# Patient Record
Sex: FEMALE | Race: WHITE
[De-identification: names, ages, dates, MRNs, and addresses within clinical notes are randomized per-mention and may not be internally consistent; named-entity substitution may affect disease eponyms.]

---

## 2018-10-18 ENCOUNTER — HOSPITAL ENCOUNTER (OUTPATIENT)
Dept: HOSPITAL 62 - RAD | Age: 43
End: 2018-10-18
Attending: INTERNAL MEDICINE
Payer: SELF-PAY

## 2018-10-18 DIAGNOSIS — C50.412: Primary | ICD-10-CM

## 2018-10-18 PROCEDURE — 70470 CT HEAD/BRAIN W/O & W/DYE: CPT

## 2018-10-18 NOTE — RADIOLOGY REPORT (SQ)
EXAM DESCRIPTION:  CT HEAD COMBO



COMPLETED DATE/TIME:  10/18/2018 2:35 pm



REASON FOR STUDY:  LEFT BREAST CANCER C50.412  MALIG NEOPLASM OF UPPER-OUTER QUADRANT OF LEFT FEMAL



COMPARISON:  None.



TECHNIQUE:  Axial images acquired through the brain without and with intravenous contrast.  Images re
viewed with bone, brain and subdural windows.  Additional sagittal and coronal reconstructions were g
enerated. Images stored on PACS.

All CT scanners at this facility use dose modulation, iterative reconstruction, and/or weight based d
osing when appropriate to reduce radiation dose to as low as reasonably achievable (ALARA).

[facility]: Dose Right  [facility]: CareDose    [facility]: Dose Right    [facility]: Teradose 4D    [facility]: Smart Technologies



CONTRAST TYPE AND DOSE:  contrast/concentration: Isovue 350.00 mg/ml; Total Contrast Delivered: 50.0 
ml; Total Saline Delivered: 55.0 ml



RENAL FUNCTION:  None required. The patient is less than 50 years old.



RADIATION DOSE:  CT Rad equipment meets quality standard of care and radiation dose reduction techniq
ues were employed. CTDIvol: 48.7 - 48.7 mGy. DLP: 2008 mGy-cm..



LIMITATIONS:  None.



FINDINGS:  VENTRICLES: Normal size and contour.

CEREBRUM: No masses. No hemorrhage. No midline shift. Normal gray/white matter differentiation. No ev
idence for acute infarction. No enhancing lesions.

CEREBELLUM: No masses. No hemorrhage. No alteration of density. No evidence for acute infarction. No 
enhancing lesions.

EXTRA-AXIAL SPACES: No fluid collections. No enhancing lesions.

ORBITS AND GLOBE: No intra- or extraconal masses. Normal contour of globe without masses.

CALVARIUM: No fracture.

PARANASAL SINUSES: No fluid or mucosal thickening.

SOFT TISSUES: No mass or hematoma.

OTHER: No other significant finding.



IMPRESSION:  NORMAL BRAIN CT WITHOUT AND WITH CONTRAST.

EVIDENCE OF ACUTE STROKE: NO.



TECHNICAL DOCUMENTATION:  JOB ID:  [id number]

Quality ID # [id number]: Final reports with documentation of one or more dose reduction techniques (e.g., Au
tomated exposure control, adjustment of the mA and/or kV according to patient size, use of iterative 
reconstruction technique)

 2011 [employer]- All Rights Reserved



Reading location - IP/workstation name: [email or web address]

## 2018-10-22 LAB
ANION GAP SERPL CALC-SCNC: 12 MMOL/L (ref 5–19)
BUN SERPL-MCNC: 6 MG/DL (ref 7–20)
CALCIUM: 9.1 MG/DL (ref 8.4–10.2)
CHLORIDE SERPL-SCNC: 103 MMOL/L (ref 98–107)
CO2 SERPL-SCNC: 27 MMOL/L (ref 22–30)
ERYTHROCYTE [DISTWIDTH] IN BLOOD BY AUTOMATED COUNT: 13 % (ref 11.5–14)
GLUCOSE SERPL-MCNC: 101 MG/DL (ref 75–110)
HCT VFR BLD CALC: 41.8 % (ref 36–47)
HGB BLD-MCNC: 14.8 G/DL (ref 12–15.5)
MCH RBC QN AUTO: 30.6 PG (ref 27–33.4)
MCHC RBC AUTO-ENTMCNC: 35.3 G/DL (ref 32–36)
MCV RBC AUTO: 87 FL (ref 80–97)
PLATELET # BLD: 143 10^3/UL (ref 150–450)
POTASSIUM SERPL-SCNC: 4 MMOL/L (ref 3.6–5)
RBC # BLD AUTO: 4.81 10^6/UL (ref 3.72–5.28)
SODIUM SERPL-SCNC: 141.9 MMOL/L (ref 137–145)
WBC # BLD AUTO: 5 10^3/UL (ref 4–10.5)

## 2018-10-26 ENCOUNTER — HOSPITAL ENCOUNTER (OUTPATIENT)
Dept: HOSPITAL 62 - OROUT | Age: 43
Discharge: HOME | End: 2018-10-26
Attending: SURGERY
Payer: SELF-PAY

## 2018-10-26 VITALS — DIASTOLIC BLOOD PRESSURE: 72 MMHG | SYSTOLIC BLOOD PRESSURE: 114 MMHG

## 2018-10-26 DIAGNOSIS — Z79.4: ICD-10-CM

## 2018-10-26 DIAGNOSIS — K44.9: ICD-10-CM

## 2018-10-26 DIAGNOSIS — C50.912: Primary | ICD-10-CM

## 2018-10-26 DIAGNOSIS — J45.909: ICD-10-CM

## 2018-10-26 DIAGNOSIS — R63.4: ICD-10-CM

## 2018-10-26 DIAGNOSIS — E03.9: ICD-10-CM

## 2018-10-26 DIAGNOSIS — Z79.51: ICD-10-CM

## 2018-10-26 DIAGNOSIS — K21.9: ICD-10-CM

## 2018-10-26 DIAGNOSIS — Z88.8: ICD-10-CM

## 2018-10-26 DIAGNOSIS — Z80.3: ICD-10-CM

## 2018-10-26 DIAGNOSIS — B19.20: ICD-10-CM

## 2018-10-26 DIAGNOSIS — Z88.5: ICD-10-CM

## 2018-10-26 DIAGNOSIS — Z01.818: ICD-10-CM

## 2018-10-26 PROCEDURE — 94640 AIRWAY INHALATION TREATMENT: CPT

## 2018-10-26 PROCEDURE — A9520 TC99 TILMANOCEPT DIAG 0.5MCI: HCPCS

## 2018-10-26 PROCEDURE — 19301 PARTIAL MASTECTOMY: CPT

## 2018-10-26 PROCEDURE — 85027 COMPLETE CBC AUTOMATED: CPT

## 2018-10-26 PROCEDURE — 80048 BASIC METABOLIC PNL TOTAL CA: CPT

## 2018-10-26 PROCEDURE — 38500 BIOPSY/REMOVAL LYMPH NODES: CPT

## 2018-10-26 PROCEDURE — 88307 TISSUE EXAM BY PATHOLOGIST: CPT

## 2018-10-26 PROCEDURE — 78195 LYMPH SYSTEM IMAGING: CPT

## 2018-10-26 PROCEDURE — 88342 IMHCHEM/IMCYTCHM 1ST ANTB: CPT

## 2018-10-26 PROCEDURE — 76098 X-RAY EXAM SURGICAL SPECIMEN: CPT

## 2018-10-26 PROCEDURE — 88305 TISSUE EXAM BY PATHOLOGIST: CPT

## 2018-10-26 PROCEDURE — 36415 COLL VENOUS BLD VENIPUNCTURE: CPT

## 2018-10-26 RX ADMIN — LIDOCAINE HYDROCHLORIDE,EPINEPHRINE BITARTRATE ONE ML: 10; .01 INJECTION, SOLUTION INFILTRATION; PERINEURAL at 15:19

## 2018-10-26 RX ADMIN — HYDROMORPHONE HYDROCHLORIDE ONE MG: 2 INJECTION INTRAMUSCULAR; INTRAVENOUS; SUBCUTANEOUS at 17:05

## 2018-10-26 RX ADMIN — HYDROMORPHONE HYDROCHLORIDE ONE MG: 2 INJECTION INTRAMUSCULAR; INTRAVENOUS; SUBCUTANEOUS at 17:25

## 2018-10-26 RX ADMIN — FENTANYL CITRATE ONE MCG: 50 INJECTION INTRAMUSCULAR; INTRAVENOUS at 16:52

## 2018-10-26 RX ADMIN — LIDOCAINE HYDROCHLORIDE,EPINEPHRINE BITARTRATE ONE ML: 10; .01 INJECTION, SOLUTION INFILTRATION; PERINEURAL at 14:40

## 2018-10-26 RX ADMIN — HYDROMORPHONE HYDROCHLORIDE ONE MG: 2 INJECTION INTRAMUSCULAR; INTRAVENOUS; SUBCUTANEOUS at 17:10

## 2018-10-26 RX ADMIN — HYDROMORPHONE HYDROCHLORIDE ONE MG: 2 INJECTION INTRAMUSCULAR; INTRAVENOUS; SUBCUTANEOUS at 17:15

## 2018-10-26 RX ADMIN — METHYLENE BLUE ONE MG: 5 INJECTION INTRAVENOUS at 15:19

## 2018-10-26 RX ADMIN — Medication ONE GM: at 15:19

## 2018-10-26 RX ADMIN — Medication ONE GM: at 14:40

## 2018-10-26 RX ADMIN — METHYLENE BLUE ONE MG: 5 INJECTION INTRAVENOUS at 14:41

## 2018-10-26 RX ADMIN — HYDROMORPHONE HYDROCHLORIDE ONE MG: 2 INJECTION INTRAMUSCULAR; INTRAVENOUS; SUBCUTANEOUS at 17:00

## 2018-10-26 RX ADMIN — FENTANYL CITRATE ONE MCG: 50 INJECTION INTRAMUSCULAR; INTRAVENOUS at 16:57

## 2018-10-26 RX ADMIN — HYDROMORPHONE HYDROCHLORIDE ONE MG: 2 INJECTION INTRAMUSCULAR; INTRAVENOUS; SUBCUTANEOUS at 17:20

## 2018-10-26 NOTE — DISCHARGE SUMMARY
Discharge Summary (SDC)





- Discharge


Final Diagnosis: 





Triple negative left breast carcinoma


Date of Surgery: 10/26/18


Discharge Date: 10/26/18


Condition: Good


Treatment or Instructions: 


Wear supportive bra; prescription on chart for pain medication; may shower in 

48 hours; follow-up with Dr. Sandip Daugherty surgical clinic in 1-2 weeks.


Referrals: 


REGGIE MACIAS MD [Primary Care Provider] - 


Discharge Diet: As Tolerated


Discharge Activity: Activity As Tolerated


Home Care Assistance: None Needed


Report the Following to Your Physician Immediately: Shortness of Breath, 

Increase in Pain, Fever over 101 Degrees

## 2018-10-26 NOTE — RADIOLOGY REPORT (SQ)
EXAM DESCRIPTION:  NM LYMPHATICS/LYMPH GLANDS



COMPLETED DATE/TIME:  10/26/2018 12:26 pm



REASON FOR STUDY:  BREAST CANCER C50.912  MALIGNANT NEOPLASM OF UNSPECIFIED SITE OF LEFT FEMAL Z80.3 
 FAMILY HISTORY OF MALIGNANT NEOPLASM OF BREAST



COMPARISON:  None.



RADIONUCLIDE AND DOSE:  612 microcuries TC-99m tilmanocept - [employer].

The route of agent administration:  Subcutaneous in the skin.



TECHNIQUE:  The skin of the left breast was prepped in sterile fashion.  The radiopharmaceutical was 
administered in equally divided doses in the periareolar breast.



LIMITATIONS:  None.



FINDINGS:  Images demonstrate activity at the injection site.  There is also activity near the left a
xilla secondary to a sentinel lymph node.



IMPRESSION:  ADMINISTRATION OF RADIOPHARMACEUTICAL FOR SENTINEL LYMPH NODE EVALUATION.



TECHNICAL DOCUMENTATION:  JOB ID:  [id number]

 2011 [employer]- All Rights Reserved



Reading location - IP/workstation name: [email or web address]

## 2018-10-26 NOTE — OPERATIVE REPORT
Operative Report


DATE OF SURGERY: 10/26/18


PREOPERATIVE DIAGNOSIS: Triple negative left breast carcinoma, poorly 

differentiated


POSTOPERATIVE DIAGNOSIS: Same


OPERATION: 1.  Page lymph node biopsy left axilla 2.  2.  Ultrasound 

directed left breast lumpectomy including anterior margin sent separately.  3.  

Interpretation of intraoperative specimen radiograph


SURGEON: ALLISON GRAHAM


ANESTHESIA: GA


TISSUE REMOVED OR ALTERED: Page lymph nodes left axilla x2; left breast 

lump with tumor and clip marker; anterior lumpectomy margin


COMPLICATIONS: 





None


ESTIMATED BLOOD LOSS: Scant


INTRAOPERATIVE FINDINGS: See below


PROCEDURE: 





The patient was seen in the preop holding area after undergoing 

lymphoscintigraphy of the left axilla.  She was found to have increased 

activity in the left axilla by bedside neoprobe scanning.





She was taken to the main operating room where general anesthesia was induced.  

Left arm was abducted.  As part of the dual mapping technique, we injected 2-1/

2 cc of dilute methylene blue into the left breast at the o'clock position 

areolar border.  Left breast was massaged.  The left breast and left axilla 

were prepped and draped in sterile fashion.





Surgical plan surgical timeout were conducted.





Ultrasonography of the left breast confirmed the well-circumscribed 2 cm tumor 

in the upper outer quadrant of the left breast, approximately 3-4 cm from the 

nipple at the 2 o'clock position.  Clip marker retained within the tumor.





We proceeded with sentinel lymph node biopsy first.  Skin was Marisol times 1% 

plain lidocaine, 3 cm incision was made in the low axilla and sentinel node 

harvesting executed.  2 sentinel lymph nodes were removed both level 1 both hot 

and blue.  The first lymph node had an in vivo count of 15,426 and an ex vivo 

count of 8814.  A sentinel hot blue lymph node with associated non-hot but blue 

lymph node was excised with an in vivo count of 3284 and an ex vivo count of 

2925.  Background counts were negligible.  We felt the axillary sentinel lymph 

node portion of the procedure was complete.





We planned our incision for left breast lumpectomy using ultrasound guidance.  

Skin was Marisol times 1% plain lidocaine, a 4+ centimeter incision was made over 

the palpable mass, and a reasonable sized lumpectomy specimen was taken from 

just under the skin all the way down to the deep breast tissue using 

intraoperative ultrasonography real-time as a guide to the dissection.  The 

lumpectomy specimen was removed from the left breast, oriented with a short 

suture in the superior position and a long suture in the lateral position.  

Imaged with the specimen radiograph machine in the operating room and found to 

contain the tumor, and the clip.





Of note radiology had gone home for the day; pathology had gone home for the 

day.





I therefore felt that given the proximity of the tumor to the anterior skin 

surface, that excising the initial lumpectomy incision, with a small portion of 

cutaneous tissue would be appropriate as an anterior margin.  This in fact was 

affected using a #15 blade.  This was essentially a [place] of skin.  It was 

oriented on a blue pad with the lateral margin marked laterally and the medial 

margin marked medially.  Skin was oriented in a couple direction consistent 

with the vertical lumpectomy made on the patient.





All specimens were sent for permanent analysis.  Wounds checked for hemostasis, 

and there was no need for cauterization.  Avitene was placed in the recesses of 

both wounds and wounds closed with 3-0 Vicryl, benzoin and Steri-Strips.





Patient tolerated procedure well, extubated, taken recovery room stable 

condition.

## 2018-10-29 NOTE — RADIOLOGY REPORT (SQ)
EXAM DESCRIPTION:  BREAST SPECIMEN



COMPLETED DATE/TIME:  10/26/2018 5:06 pm



REASON FOR STUDY:  LEFT BREAST LUMPECTOMY C50.912  MALIGNANT NEOPLASM OF UNSPECIFIED SITE OF LEFT FEM
AL Z80.3  FAMILY HISTORY OF MALIGNANT NEOPLASM OF BREAST



COMPARISON:  None.



TECHNIQUE:  Specimen radiograph from breast procedure performed in the operating room.



LIMITATIONS:  None.



FINDINGS:  Specimen radiograph from breast procedure performed in the operating room.

Please see procedure note for details and final pathology.



IMPRESSION:  Specimen radiograph.



TECHNICAL DOCUMENTATION:  JOB ID:  [id number]



Reading location - IP/workstation name: [facility]

## 2018-11-04 ENCOUNTER — HOSPITAL ENCOUNTER (EMERGENCY)
Dept: HOSPITAL 62 - ER | Age: 43
Discharge: LEFT BEFORE DISCHARGE | End: 2018-11-04
Payer: SELF-PAY

## 2018-11-04 VITALS — SYSTOLIC BLOOD PRESSURE: 128 MMHG | DIASTOLIC BLOOD PRESSURE: 86 MMHG

## 2018-11-04 DIAGNOSIS — Z53.21: Primary | ICD-10-CM

## 2018-11-21 ENCOUNTER — HOSPITAL ENCOUNTER (OUTPATIENT)
Dept: HOSPITAL 62 - OROUT | Age: 43
Discharge: HOME | End: 2018-11-21
Attending: SURGERY
Payer: MEDICAID

## 2018-11-21 VITALS — DIASTOLIC BLOOD PRESSURE: 60 MMHG | SYSTOLIC BLOOD PRESSURE: 100 MMHG

## 2018-11-21 DIAGNOSIS — Z88.8: ICD-10-CM

## 2018-11-21 DIAGNOSIS — Z86.19: ICD-10-CM

## 2018-11-21 DIAGNOSIS — Z80.3: ICD-10-CM

## 2018-11-21 DIAGNOSIS — Z88.5: ICD-10-CM

## 2018-11-21 DIAGNOSIS — C50.912: Primary | ICD-10-CM

## 2018-11-21 DIAGNOSIS — K44.9: ICD-10-CM

## 2018-11-21 DIAGNOSIS — Z79.899: ICD-10-CM

## 2018-11-21 DIAGNOSIS — Z87.891: ICD-10-CM

## 2018-11-21 DIAGNOSIS — J45.909: ICD-10-CM

## 2018-11-21 DIAGNOSIS — E03.9: ICD-10-CM

## 2018-11-21 DIAGNOSIS — R63.4: ICD-10-CM

## 2018-11-21 DIAGNOSIS — K21.9: ICD-10-CM

## 2018-11-21 LAB
ERYTHROCYTE [DISTWIDTH] IN BLOOD BY AUTOMATED COUNT: 12.8 % (ref 11.5–14)
HCT VFR BLD CALC: 40.5 % (ref 36–47)
HGB BLD-MCNC: 14.5 G/DL (ref 12–15.5)
MCH RBC QN AUTO: 30.7 PG (ref 27–33.4)
MCHC RBC AUTO-ENTMCNC: 35.8 G/DL (ref 32–36)
MCV RBC AUTO: 86 FL (ref 80–97)
PLATELET # BLD: 153 10^3/UL (ref 150–450)
RBC # BLD AUTO: 4.73 10^6/UL (ref 3.72–5.28)
WBC # BLD AUTO: 6 10^3/UL (ref 4–10.5)

## 2018-11-21 PROCEDURE — 71045 X-RAY EXAM CHEST 1 VIEW: CPT

## 2018-11-21 PROCEDURE — C1752 CATH,HEMODIALYSIS,SHORT-TERM: HCPCS

## 2018-11-21 PROCEDURE — 36561 INSERT TUNNELED CV CATH: CPT

## 2018-11-21 PROCEDURE — 85027 COMPLETE CBC AUTOMATED: CPT

## 2018-11-21 PROCEDURE — 36415 COLL VENOUS BLD VENIPUNCTURE: CPT

## 2018-11-21 PROCEDURE — 77001 FLUOROGUIDE FOR VEIN DEVICE: CPT

## 2018-11-21 PROCEDURE — C1769 GUIDE WIRE: HCPCS

## 2018-11-21 PROCEDURE — C1788 PORT, INDWELLING, IMP: HCPCS

## 2018-11-21 NOTE — DISCHARGE SUMMARY
Discharge Summary (SDC)





- Discharge


Final Diagnosis: 





Left breast cancer


Date of Surgery: 11/21/18


Discharge Date: 11/21/18


Condition: Stable


Treatment or Instructions: 


PLAN:


Wound Care:


-You may shower in 48 hours. Leave steri strips (paper bandaids) intact until 

they fall off on their own. Do not scrub area. Allow warm water/soap to wash 

over area, pat dry and cover if needed.





Pain Management:


-You may take Toradol 10mg one pill by mouth every six hours as needed for pain.





Follow Up:


- You may follow up at [facility] in 7-10 days for evaluation. Call 

clinic or go to ER if you experience swelling in your neck, difficulty breathing

, foul-smelling drainage, or redness surrounding incision site. Call clinic 

sooner with questions/concerns. 





Prescriptions: 


Ketorolac Tromethamine [Toradol 10 mg Tablet] 10 mg PO Q6HP PRN #20 tablet


 PRN Reason: 


Referrals: 


REGGIE MACIAS MD [Primary Care Provider] - 


Discharge Diet: As Tolerated


Discharge Activity: Activity As Tolerated


Report the Following to Your Physician Immediately: Shortness of Breath, 

Increase in Pain, Unusual Bleeding, Redness, Swelling, Warmth, Drainage-Foul 

Smelling

## 2018-11-21 NOTE — RADIOLOGY REPORT (SQ)
EXAM DESCRIPTION:  CHEST SINGLE VIEW



COMPLETED DATE/TIME:  11/21/2018 10:20 am



REASON FOR STUDY:  Port placement



COMPARISON:  None.



EXAM PARAMETERS:  NUMBER OF VIEWS: One view.

TECHNIQUE: Single frontal radiographic view of the chest acquired.

RADIATION DOSE: NA

LIMITATIONS: None.



FINDINGS:  LUNGS AND PLEURA: No opacities, masses or pneumothorax. No pleural effusion.

MEDIASTINUM AND HILAR STRUCTURES: No masses.  Contour normal.

HEART AND VASCULAR STRUCTURES: Heart normal in size.  Normal vasculature.

BONES: No acute findings.

HARDWARE: Right-sided Port-A-Cath is identified with its tip the level of the superior vena cava P

OTHER: No other significant finding.



IMPRESSION:  Right-sided Port-A-Cath with its tip at the level of the superior vena cava.  No pneumot
horax is seen.  Other findings as noted above



TECHNICAL DOCUMENTATION:  JOB ID:  [id number]

 2011 [employer]- All Rights Reserved



Reading location - IP/workstation name: [email or web address]

## 2018-11-21 NOTE — OPERATIVE REPORT
Operative Report


DATE OF SURGERY: 11/21/18


PREOPERATIVE DIAGNOSIS: Triple negative left breast carcinoma, status post left 

breast lumpectomy and sentinel lymph node biopsy


POSTOPERATIVE DIAGNOSIS: Same


OPERATION: 1.  Focused ultrasound of the neck.  2.  Ultrasound directed 

insertion of single-lumen Cxaxvy-k-Ktme catheter right subclavian position.  3.

  Interpretation of intraoperative fluoroscopy.


SURGEON: ALLISON ALLEN ASSISTANT: SUBHASH CARVAJAL


ANESTHESIA: LMAC


TISSUE REMOVED OR ALTERED: Scant


COMPLICATIONS: 





None


ESTIMATED BLOOD LOSS: 20 cc


INTRAOPERATIVE FINDINGS: See below


PROCEDURE: 





The patient was taken from the preop holding area the main operating room where 

LMAC anesthesia was induced.  Arms were tucked at the side, neck and head 

externally rotated to the left side.  The right neck chest and axilla were 

prepped and draped in sterile fashion.





Surgical plan surgical timeout were conducted.





Using real-time ultrasound as a guide, the right internal jugular vein was 

identified, found to be compressible suitable for cannulation.  Overlying skin 

was anesthetized with 1% plain lidocaine.  A small incision was made with a #15 

blade, and under fluoroscopic guidance, a micro needle and wire was threaded 

into the vessel.  The micro wire did not advance in the usual fashion.  We 

threaded the micro-introducer dilator and sheath over the micro wire and the 

microwire removed and the 0.035 inch guidewire inserted into the sheath.  

During this transition, it was apparent that the blood coming from around the 

wire was arterial, and therefore the procedure was aborted.  The wire and 

sheath were removed uneventfully and gentle pressure held.





During this time the patient's peripheral IV access early failed.  Anesthesia 

had to manipulate the Hep-Lock to reestablish successful IV access which was 

accomplished.  Appropriate level of sedation was again achieved.





There is no evidence of hematoma in the neck.  We felt that we could proceed as 

planned again accessing the right neck.  Using real-time ultrasound again as a 

guide, a second attempt was made to cannulate the right internal jugular vein.  

This was successful with the micro needle and wire.  A suitable site for 

placement of port was chosen in the right subclavian position.  Skin was 

anesthetized with 1% plain lidocaine.  A 3 cm incision was made with a knife, 

port pocket developed large enough to accommodate a single-chamber port.  

Catheter was then trimmed the appropriate length, tunneled between the 2 

incisions, attached to the port along with the plastic ring.





Under fluoroscopic guidance, real-time, the prone needle was switched over to a 

conventional 0.030 inch guidewire using the micro-introducer sheath.  We now 

threaded over the guidewire a 9 [employer] dilator introducer sheath.  Guidewire 

dilator removed, catheter threaded into the sheath, strip away sheath removed 

leaving the catheter in good position.  The tip of the catheter was in the 

proximal superior vena cava.  Is no kinking of the catheter at the neck.  We 

aspirated and flushed the Pjcefr-a-Afaw chamber with heparinized saline.





There is no evidence of ectopy or swelling of the neck.  Sponge and needle 

counts are correct.  Wounds were felt to be suitable for closure and this was 

affected using 3-0 Vicryl suture.  Sterile dressings were applied with benzoin 

and Steri-Strips





Patient tolerated procedure well.  She was taken to recovery in stable 

condition.  Portable x-ray pending time dictation.





The physician assistant, Ms. Pino, provided assistance during this case by: 

Assisting with retracting tissue, instillation of local anesthesia and closure 

of skin incisions.

## 2018-11-21 NOTE — RADIOLOGY REPORT (SQ)
EXAM DESCRIPTION:  FLUORO/CV PLACEMENT



COMPLETED DATE/TIME:  11/21/2018 10:37 am



REASON FOR STUDY:  PORTACATH PLCMT RT SIDE ASST WITH FLUORO IN OR C50.912  MALIGNANT NEOPLASM OF UNSP
ECIFIED SITE OF LEFT FEMAL



COMPARISON:  None.



FLUOROSCOPY TIME:  2.2 minute

3 images saved to PACS.



TECHNIQUE:  Intra-operative images acquired during surgical procedure to evaluate progress.

NUMBER OF IMAGES: 3 in



LIMITATIONS:  None.



FINDINGS:  Fluoroscopic images were obtained during placement of a Port-A-Cath.  The tip of the Port-
A-Cath is identified at the level of the superior vena cava.  Please refer to the surgeon's operative
 report for additional information



IMPRESSION:  IMAGE(S) OBTAINED DURING PROCEDURE.



COMMENT:  Quality :  Final reports for procedures using fluoroscopy that document radiation exp
osure indices, or exposure time and number of fluorographic images (if radiation exposure indices are
 not available)

Please consult full operative report of the attending physician for description of the procedure.



TECHNICAL DOCUMENTATION:  JOB ID:  [id number]

 2011 [employer]- All Rights Reserved



Reading location - IP/workstation name: [email or web address]

## 2018-11-26 ENCOUNTER — HOSPITAL ENCOUNTER (OUTPATIENT)
Dept: HOSPITAL 62 - RAD | Age: 43
End: 2018-11-26
Attending: INTERNAL MEDICINE
Payer: MEDICAID

## 2018-11-26 DIAGNOSIS — C50.412: ICD-10-CM

## 2018-11-26 DIAGNOSIS — Z13.6: ICD-10-CM

## 2018-11-26 DIAGNOSIS — Z08: Primary | ICD-10-CM

## 2018-11-26 LAB
ADD MANUAL DIFF: NO
BASOPHILS # BLD AUTO: 0 10^3/UL (ref 0–0.2)
BASOPHILS NFR BLD AUTO: 0.3 % (ref 0–2)
EOSINOPHIL # BLD AUTO: 0.2 10^3/UL (ref 0–0.6)
EOSINOPHIL NFR BLD AUTO: 3.7 % (ref 0–6)
ERYTHROCYTE [DISTWIDTH] IN BLOOD BY AUTOMATED COUNT: 12.9 % (ref 11.5–14)
HCT VFR BLD CALC: 37.4 % (ref 36–47)
HGB BLD-MCNC: 13.2 G/DL (ref 12–15.5)
LYMPHOCYTES # BLD AUTO: 1.9 10^3/UL (ref 0.5–4.7)
LYMPHOCYTES NFR BLD AUTO: 35.6 % (ref 13–45)
MCH RBC QN AUTO: 30.7 PG (ref 27–33.4)
MCHC RBC AUTO-ENTMCNC: 35.4 G/DL (ref 32–36)
MCV RBC AUTO: 87 FL (ref 80–97)
MONOCYTES # BLD AUTO: 0.4 10^3/UL (ref 0.1–1.4)
MONOCYTES NFR BLD AUTO: 7.6 % (ref 3–13)
NEUTROPHILS # BLD AUTO: 2.8 10^3/UL (ref 1.7–8.2)
NEUTS SEG NFR BLD AUTO: 52.8 % (ref 42–78)
PLATELET # BLD: 149 10^3/UL (ref 150–450)
RBC # BLD AUTO: 4.31 10^6/UL (ref 3.72–5.28)
TOTAL CELLS COUNTED % (AUTO): 100 %
WBC # BLD AUTO: 5.3 10^3/UL (ref 4–10.5)

## 2018-11-26 PROCEDURE — 36415 COLL VENOUS BLD VENIPUNCTURE: CPT

## 2018-11-26 PROCEDURE — A9560 TC99M LABELED RBC: HCPCS

## 2018-11-26 PROCEDURE — 85025 COMPLETE CBC W/AUTO DIFF WBC: CPT

## 2018-11-26 PROCEDURE — 78472 GATED HEART PLANAR SINGLE: CPT

## 2018-11-26 NOTE — RADIOLOGY REPORT (SQ)
EXAM DESCRIPTION:  NM MUGA REST



COMPLETED DATE/TIME:  11/26/2018 3:36 pm



REASON FOR STUDY:  Z08 ENCNTR FOR FOLLOW-UP EXAM AFTER TRTMT FOR MALIGNANT NEOPLASM Z08  ENCNTR FOR F
OLLOW-UP EXAM AFTER TRTMT FOR MALIGNANT NEOP



COMPARISON:  None.



RADIONUCLIDE AND DOSE:  26.3 mCi technetium 99m labeled red blood cells

The route of agent administration: Intravenous



TECHNIQUE:  Following administration of the radionuclide, gated images of the heart are obtained in t
hree projections.  Left ventricular functional analysis performed.



LIMITATIONS:  None.



FINDINGS:   LEFT VENTRICULAR FUNCTION:

EJECTION FRACTION: 76%.

END-DIASTOLIC VOLUME: 104 mL.

END-SYSTOLIC VOLUME: 23 mL.

WALL MOTION: No focal wall motion abnormalities.

OTHER: No other significant finding.



IMPRESSION:  NORMAL CARDIAC MUGA STUDY.  NORMAL LEFT VENTRICULAR EJECTION FRACTION OF 76%.



TECHNICAL DOCUMENTATION:  JOB ID:  [id number]

 2011 [employer]- All Rights Reserved



Reading location - IP/workstation name: [email or web address]

## 2019-02-08 ENCOUNTER — HOSPITAL ENCOUNTER (EMERGENCY)
Dept: HOSPITAL 62 - ER | Age: 44
Discharge: HOME | End: 2019-02-08
Payer: MEDICAID

## 2019-02-08 VITALS — SYSTOLIC BLOOD PRESSURE: 107 MMHG | DIASTOLIC BLOOD PRESSURE: 94 MMHG

## 2019-02-08 DIAGNOSIS — R61: ICD-10-CM

## 2019-02-08 DIAGNOSIS — Z79.899: ICD-10-CM

## 2019-02-08 DIAGNOSIS — R68.83: ICD-10-CM

## 2019-02-08 DIAGNOSIS — Z82.49: ICD-10-CM

## 2019-02-08 DIAGNOSIS — Z88.5: ICD-10-CM

## 2019-02-08 DIAGNOSIS — J45.909: ICD-10-CM

## 2019-02-08 DIAGNOSIS — R07.9: Primary | ICD-10-CM

## 2019-02-08 DIAGNOSIS — R23.2: ICD-10-CM

## 2019-02-08 DIAGNOSIS — C50.919: ICD-10-CM

## 2019-02-08 DIAGNOSIS — R25.2: ICD-10-CM

## 2019-02-08 DIAGNOSIS — R11.0: ICD-10-CM

## 2019-02-08 LAB
ADD MANUAL DIFF: NO
ALBUMIN SERPL-MCNC: 4.3 G/DL (ref 3.5–5)
ALP SERPL-CCNC: 42 U/L (ref 38–126)
ALT SERPL-CCNC: 25 U/L (ref 9–52)
ANION GAP SERPL CALC-SCNC: 10 MMOL/L (ref 5–19)
AST SERPL-CCNC: 17 U/L (ref 14–36)
BASOPHILS # BLD AUTO: 0 10^3/UL (ref 0–0.2)
BASOPHILS NFR BLD AUTO: 0.4 % (ref 0–2)
BILIRUB DIRECT SERPL-MCNC: 0.2 MG/DL (ref 0–0.4)
BILIRUB SERPL-MCNC: 0.8 MG/DL (ref 0.2–1.3)
BUN SERPL-MCNC: 11 MG/DL (ref 7–20)
CALCIUM: 9.1 MG/DL (ref 8.4–10.2)
CHLORIDE SERPL-SCNC: 106 MMOL/L (ref 98–107)
CO2 SERPL-SCNC: 27 MMOL/L (ref 22–30)
EOSINOPHIL # BLD AUTO: 0 10^3/UL (ref 0–0.6)
EOSINOPHIL NFR BLD AUTO: 0.5 % (ref 0–6)
ERYTHROCYTE [DISTWIDTH] IN BLOOD BY AUTOMATED COUNT: 16.5 % (ref 11.5–14)
GLUCOSE SERPL-MCNC: 88 MG/DL (ref 75–110)
HCT VFR BLD CALC: 35.1 % (ref 36–47)
HGB BLD-MCNC: 12.4 G/DL (ref 12–15.5)
LYMPHOCYTES # BLD AUTO: 1.8 10^3/UL (ref 0.5–4.7)
LYMPHOCYTES NFR BLD AUTO: 27.3 % (ref 13–45)
MCH RBC QN AUTO: 31.8 PG (ref 27–33.4)
MCHC RBC AUTO-ENTMCNC: 35.3 G/DL (ref 32–36)
MCV RBC AUTO: 90 FL (ref 80–97)
MONOCYTES # BLD AUTO: 0.6 10^3/UL (ref 0.1–1.4)
MONOCYTES NFR BLD AUTO: 9.2 % (ref 3–13)
NEUTROPHILS # BLD AUTO: 4.1 10^3/UL (ref 1.7–8.2)
NEUTS SEG NFR BLD AUTO: 62.6 % (ref 42–78)
PLATELET # BLD: 152 10^3/UL (ref 150–450)
POTASSIUM SERPL-SCNC: 4 MMOL/L (ref 3.6–5)
PROT SERPL-MCNC: 7 G/DL (ref 6.3–8.2)
RBC # BLD AUTO: 3.9 10^6/UL (ref 3.72–5.28)
SODIUM SERPL-SCNC: 142.8 MMOL/L (ref 137–145)
TOTAL CELLS COUNTED % (AUTO): 100 %
WBC # BLD AUTO: 6.5 10^3/UL (ref 4–10.5)

## 2019-02-08 PROCEDURE — 80053 COMPREHEN METABOLIC PANEL: CPT

## 2019-02-08 PROCEDURE — 84484 ASSAY OF TROPONIN QUANT: CPT

## 2019-02-08 PROCEDURE — 99284 EMERGENCY DEPT VISIT MOD MDM: CPT

## 2019-02-08 PROCEDURE — 93010 ELECTROCARDIOGRAM REPORT: CPT

## 2019-02-08 PROCEDURE — 71045 X-RAY EXAM CHEST 1 VIEW: CPT

## 2019-02-08 PROCEDURE — 36415 COLL VENOUS BLD VENIPUNCTURE: CPT

## 2019-02-08 PROCEDURE — 85025 COMPLETE CBC W/AUTO DIFF WBC: CPT

## 2019-02-08 PROCEDURE — 93005 ELECTROCARDIOGRAM TRACING: CPT

## 2019-02-08 PROCEDURE — 96374 THER/PROPH/DIAG INJ IV PUSH: CPT

## 2019-02-08 NOTE — ER DOCUMENT REPORT
ED General





- General


Chief Complaint: Chest Pain


Stated Complaint: CHEST PAIN,NAUSEA,SHAKY


Time Seen by Provider: 02/08/19 13:00


Primary Care Provider: 


ANDRADE RUIZ MD [Primary Care Provider] - Follow up tomorrow


Notes: 





Patient is a 43-year-old female with a past medical history of stage I breast 

cancer currently on chemotherapy who presents emergency department complaints of

chest pain for the last 2 weeks.  She describes it as a sharp and stabbing 

sensation that turns into a squeeze.  She denies any radiation of the pain.  No 

alleviating or exacerbating factors.  Patient states that her last chemo 

treatment was on Wednesday. She is not sure if she is having a side effect to 

the medications or something else.  She states that this morning she is been 

having nausea, feeling shaky, hot, flushed along with the chest pain.  She 

contacted 's office and was told to go to the ED for evaluation.  Megan

ent denies any hypertension, hyperlipidemia, diabetes, coronary artery disease. 

She states that she does have a family history of coronary artery disease.  She 

denies any recent travel, recent surgery, calf pain, calf swelling.


TRAVEL OUTSIDE OF THE [place] IN LAST 30 DAYS: No





- Related Data


Allergies/Adverse Reactions: 


                                        





morphine Allergy (Verified 02/08/19 12:28)


   


metoclopramide [From Reglan] Adverse Reaction (Verified 02/08/19 12:28)


   











Past Medical History





- General


Information source: Patient





- Social History


Smoking Status: Never Smoker


Frequency of alcohol use: None


Drug Abuse: None


Lives with: Family


Family History: Reviewed & Not Pertinent


Patient has suicidal ideation: No


Patient has homicidal ideation: No





- Past Medical History


Cardiac Medical History: 


   Denies: Hx Coronary Artery Disease, Hx Heart Attack, Hx Hypertension


Pulmonary Medical History: Reports: Hx Asthma - MILD


   Denies: Hx Bronchitis, Hx COPD, Hx Pneumonia


Neurological Medical History: Denies: Hx Cerebrovascular Accident, Hx Seizures


Renal/ Medical History: Denies: Hx Peritoneal Dialysis


Musculoskeletal Medical History: Reports Hx Arthritis - KALA KNEES/SHOULDERS  

BACK


Past Surgical History: Reports: Hx Appendectomy, Hx Breast Surgery - left 

lumpectomy and 4 lymph nodes removed, Hx Cholecystectomy, Hx Gynecologic Surgery

- partial hysterectomy, Hx Orthopedic Surgery - left wrist, shoulder, bilateral 

knee





- Immunizations


Hx Diphtheria, Pertussis, Tetanus Vaccination: Yes





Review of Systems





- Review of Systems


Notes: 





Constitutional: Negative for fever.  Positive for hot and cold flashes


HENT: Negative for sore throat.


Eyes: Negative for visual changes.


Cardiovascular: Positive for chest pain.


Respiratory: Negative for shortness of breath.


Gastrointestinal: Negative for abdominal pain, positive for nausea


Genitourinary: Negative for dysuria.


Musculoskeletal: Negative for back pain.


Skin: Negative for rash.


Neurological: Negative for headaches, weakness or numbness.





10 point ROS negative except as marked above and in HPI.





Physical Exam





- Vital signs


Vitals: 


                                        











Temp Pulse Resp BP Pulse Ox


 


 98.0 F   87   18   118/81   100 


 


 02/08/19 12:37  02/08/19 12:37  02/08/19 12:37  02/08/19 12:37  02/08/19 12:37











Interpretation: Normal


Notes: 





PHYSICAL EXAMINATION:





GENERAL: No acute distress





HEAD: Atraumatic, normocephalic.





EYES: Pupils equal round and reactive to light, extraocular movements intact, 

sclera anicteric, conjunctiva are normal.





ENT: nares patent, oropharynx clear without exudates.  Moist mucous membranes.





NECK: Normal range of motion, supple without lymphadenopathy





LUNGS: Breath sounds clear to auscultation bilaterally and equal.  No wheezes 

rales or rhonchi.





HEART: Regular rate and rhythm without murmurs





ABDOMEN: Soft, nontender, normoactive bowel sounds.  No guarding, no rebound.  

No masses appreciated.





EXTREMITIES: Normal range of motion, no pitting or edema.  No cyanosis.





NEUROLOGICAL: No focal neurological deficits. Moves all extremities spont

aneously and on command.





PSYCH: Normal mood, normal affect.





SKIN: Warm, Dry, normal turgor, no rashes or lesions noted.





Course





- Re-evaluation


Re-evalutation: 





02/08/19 20:07


Presentation of an overall well-appearing 43-year-old female actively receiving 

chemotherapy with complaints of multiple constitutional symptoms including f

eeling hot, cold, shaking, nauseated and diaphoretic with intermittent spasming 

chest pain.  Her evaluation is quite benign.  No focal findings on examination. 

2 troponins 4 hours apart negative.  Chest x-ray clear.  EKG unremarkable.  

Blood counts within acceptable limits.  Chemistries completely unremarkable.  

Patient's history does seem to be most consistent with more of an esophageal 

spasm or gastroduodenitis type picture.  This would likewise fit with a side 

effect profile of the chemotherapeutic agents that the patient is receiving.  I 

did discuss this case with the patient's oncologist Dr. Don who is in 

agreement.  Very low clinical suspicion of acute pulmonary embolus as patient d

enies any shortness of breath, pleuritic pain, has no unilateral leg swelling, 

no tachycardia or hypoxia.  Although acknowledged that she has a risk factor of 

cancer actively on chemotherapy given the absence of any clinical features to 

suggest this diagnosis I do not believe proceeding with CTA is appropriate.  

Patient is in agreement with that.  Patient will be started on H2 blockers, 

Carafate, nausea medications as needed.  I did discuss with the patient starting

anxiolysis per recommendations of Dr. Don but the patient did decline.  At 

this time will discharge with return precautions and follow-up recommendations. 

Verbal discharge instructions given a the bedside and opportunity for questions 

given. Medication warnings reviewed. Patient is in agreement with this plan and 

has verbalized understanding of return precautions and the need for primary care

follow-up in the next 24-72 hours.








- Vital Signs


Vital signs: 


                                        











Temp Pulse Resp BP Pulse Ox


 


 98.2 F   87   10 L  107/94 H  100 


 


 02/08/19 20:01  02/08/19 12:37  02/08/19 20:01  02/08/19 20:01  02/08/19 20:01














- Laboratory


Result Diagrams: 


                                 02/08/19 13:59





                                 02/08/19 13:59


Laboratory results interpreted by me: 


                                        











  02/08/19





  13:59


 


Hct  35.1 L


 


RDW  16.5 H














- Diagnostic Test


Radiology reviewed: Image reviewed, Reports reviewed


Radiology results interpreted by me: 





02/08/19 20:09


Chest x-ray: No acute infiltrate or pneumothorax





- EKG Interpretation by Me


Additional EKG results interpreted by me: 





02/08/19 20:10


Sinus rhythm, rate 88.  No ST elevations or depressions.  QTC is 426.





Discharge





- Discharge


Clinical Impression: 


 Chest discomfort, Chills, Nausea





Condition: Stable


Disposition: HOME, SELF-CARE


Additional Instructions: 


Your symptoms appear to be most likely related to the chemotherapy that you are 

receiving.  Your labs, chest x-ray and EKG are all reassuring today.  We are 

trying medications which may help with some the symptoms that you are having 

which could be related to your esophagus and intestines.  Please take famotidine

40 mg in the morning and at night.  Take Carafate prior to meals.  Please 

follow-up with your oncologist within the next 24-48 hours.  Return if you 

develop worsening of your symptoms, pass out, develop a fever of greater than 

100.4 F, develop difficulty breathing, pain with breathing, began coughing bloo

d, or have any symptoms that are worrisome to you.


Prescriptions: 


Famotidine 40 mg PO BID #60 tablet


Sucralfate [Carafate 1 gm Tablet] 1 gm PO ACHS #120 tablet


Referrals: 


ANDRADE RUIZ MD [Primary Care Provider] - Follow up tomorrow

## 2019-02-08 NOTE — ER DOCUMENT REPORT
ED Medical Screen (RME)





- General


Chief Complaint: Chest Pain


Stated Complaint: CHEST PAIN,NAUSEA,SHAKY


Time Seen by Provider: 02/08/19 13:00


Primary Care Provider: 


ANDRADE RUIZ MD [Primary Care Provider] - Follow up as needed


Information source: Patient


Notes: 





43-year-old female presents emergency department complaints of chest pain for 

the last 2 weeks.  She describes it as a sharp and stabbing sensation that turns

into a squeeze.  She denies any radiation of the pain.  No alleviating or 

exacerbating factors.  Patient states that her last chemo treatment was on 

Wednesday.  Her doctor is been changing of the chemo medications.  She is not 

sure if she is having a side effect to the medications or something else.  She 

states that this morning she is been having nausea, feeling shaky, hot, flushed 

along with the chest pain.  She contacted 's office and was told to go 

to the ED for evaluation.  Patient denies any hypertension, hyperlipidemia, 

diabetes, coronary artery disease.  She states that she does have a family 

history of coronary artery disease.  She denies any recent travel, recent 

surgery, calf pain, calf swelling.





I have greeted and performed a rapid initial assessment of this patient.  A 

comprehensive ED assessment and evaluation of the patient, analysis of test 

results and completion of the medical decision making process will be conducted 

by additional ED providers.





PHYSICAL EXAMINATION:





GENERAL: Ill appearing. 





HEAD: Atraumatic, normocephalic.





EYES: Pupils equal round extraocular movements intact,  conjunctiva are normal.





ENT: Nares patent





NECK: Normal range of motion





LUNGS: No respiratory distress





Musculoskeletal: Normal range of motion





NEUROLOGICAL:  Normal speech, normal gait. 





PSYCH: Normal mood, normal affect.





SKIN: Warm, Dry, normal turgor, no rashes or lesions noted.


TRAVEL OUTSIDE OF THE [place] IN LAST 30 DAYS: No





- Related Data


Allergies/Adverse Reactions: 


                                        





morphine Allergy (Verified 02/08/19 12:28)


   


metoclopramide [From Reglan] Adverse Reaction (Verified 02/08/19 12:28)


   











Past Medical History





- Past Medical History


Cardiac Medical History: 


   Denies: Hx Coronary Artery Disease, Hx Heart Attack, Hx Hypertension


Pulmonary Medical History: Reports: Hx Asthma - MILD


   Denies: Hx Bronchitis, Hx COPD, Hx Pneumonia


Neurological Medical History: Denies: Hx Cerebrovascular Accident, Hx Seizures


Musculoskeltal Medical History: Reports Hx Arthritis - KALA KNEES/SHOULDERS  BACK





- Immunizations


Hx Diphtheria, Pertussis, Tetanus Vaccination: Yes


History of Influenza Vaccine for 10/2017 - 3/2018 Season: No





Physical Exam





- Vital signs


Vitals: 





                                        











Temp Pulse Resp BP Pulse Ox


 


 98.0 F   87   18   118/81   100 


 


 02/08/19 12:37  02/08/19 12:37  02/08/19 12:37  02/08/19 12:37  02/08/19 12:37














Course





- Vital Signs


Vital signs: 





                                        











Temp Pulse Resp BP Pulse Ox


 


 98.0 F   87   18   118/81   100 


 


 02/08/19 12:37  02/08/19 12:37  02/08/19 12:37  02/08/19 12:37  02/08/19 12:37














Doctor's Discharge





- Discharge


Referrals: 


ANDRADE RUIZ MD [Primary Care Provider] - Follow up as needed

## 2019-02-08 NOTE — RADIOLOGY REPORT (SQ)
EXAM DESCRIPTION:  CHEST SINGLE VIEW



COMPLETED DATE/TIME:  2/8/2019 2:02 pm



REASON FOR STUDY:  chest pain



COMPARISON:  11/21/2018



EXAM PARAMETERS:  NUMBER OF VIEWS: One view.

TECHNIQUE: Single frontal radiographic view of the chest acquired.

RADIATION DOSE: NA

LIMITATIONS: None.



FINDINGS:  LUNGS AND PLEURA: No opacities, masses or pneumothorax. No pleural effusion.

MEDIASTINUM AND HILAR STRUCTURES: No masses.  Contour normal.

HEART AND VASCULAR STRUCTURES: Heart normal in size.  Normal vasculature.

BONES: No acute findings.

HARDWARE: None in the chest.

OTHER: Stable position of right-sided port.



IMPRESSION:  NO ACUTE RADIOGRAPHIC FINDING IN THE CHEST.



TECHNICAL DOCUMENTATION:  JOB ID:  [id number]

 2011 [email or web address]- All Rights Reserved



Reading location - IP/workstation name: [email or web address]

## 2020-03-26 ENCOUNTER — HOSPITAL ENCOUNTER (OUTPATIENT)
Dept: HOSPITAL 62 - RDC | Age: 45
End: 2020-03-26
Attending: NURSE PRACTITIONER
Payer: MEDICAID

## 2020-03-26 VITALS — DIASTOLIC BLOOD PRESSURE: 89 MMHG | SYSTOLIC BLOOD PRESSURE: 143 MMHG

## 2020-03-26 DIAGNOSIS — Z20.828: Primary | ICD-10-CM

## 2020-03-26 LAB
A TYPE INFLUENZA AG: NEGATIVE
B INFLUENZA AG: NEGATIVE

## 2020-03-26 PROCEDURE — 87880 STREP A ASSAY W/OPTIC: CPT

## 2020-03-26 PROCEDURE — 87635 SARS-COV-2 COVID-19 AMP PRB: CPT

## 2020-03-26 PROCEDURE — 87804 INFLUENZA ASSAY W/OPTIC: CPT

## 2020-03-26 PROCEDURE — 87070 CULTURE OTHR SPECIMN AEROBIC: CPT

## 2020-03-26 NOTE — ER RDC ASSESSMENT REPORT
Intake





- In the Last 14 days


Have you traveled outside [place]?: No


Have you been in close contact with someone CONFIRMED: No


Worked in Healthcare?: No





- Symptoms


Subjective Fever(Felt feverish): Yes


Chills: Yes


Muscule Aches: Yes


Runny Nose: No


Sore Throat: Yes


Cough (New or worsening chronic cough): Yes


Shortness of breath: No


Nausea or Vomiting: No


Headache: Yes


Abdominal Pain: No


Diarrhea(3 or more loose stools in last 24 hours): Yes





- Do you have any of the following


Chronic lung disease: Asthma or emphysema or COPD: Yes


Chronic Lung Disease Comment: 





asthma


Cystic Fibrosis: No


Diabetes: No


High Blood Pressure: No


Cardiovascular Disease: No


Chronic Kidney Disease: No


Chronic Liver Disease: Yes


Chronic Liver Disease Comment: 





hep C, RESTREPO


Chronic blood disorder like Sickle Cell Disease: Yes


Chronic Blood Disorder Comment: 





cancer-chemo 5/2019


Weak immune system due to disease or medication: Yes


Neurologic condition that limits movement: No


Developmental delay - Moderate to Severe: No





- Objective


Temperature: 96.7 F


Pulse Rate: 85


Respiratory Rate: 20


Blood Pressure: 143/89


O2 Sat by Pulse Oximetry: 98


Objective: 


Given above, testing performed: 
































If Testing Performed:


Test Specimen Type Sent to











General





- General


Information source: Patient


Notes: 





Patient presents with upper respiratory symptoms and concerned about possible 

covid 19 exposure.





- HPI


Associated symptoms: Nonproductive cough, Fever.  denies: Shortness of breath


Exacerbated by: Denies


Relieved by: Denies





- Related Data


Allergies/Adverse Reactions: 


                                        





morphine Allergy (Verified 02/08/19 12:28)


   


metoclopramide [From Reglan] Adverse Reaction (Verified 02/08/19 12:28)


   











Past Medical History





- General


Information source: Patient





- Social History


Smoking Status: Former Smoker


Family History: Reviewed & Not Pertinent





- Past Medical History


Cardiac Medical History: 


   Denies: Hx Coronary Artery Disease, Hx Heart Attack, Hx Hypertension


Pulmonary Medical History: Reports: Hx Asthma - MILD


   Denies: Hx Bronchitis, Hx COPD, Hx Pneumonia


Neurological Medical History: Denies: Hx Cerebrovascular Accident, Hx Seizures


Renal/ Medical History: Denies: Hx Peritoneal Dialysis


GI Medical History: Reports: Other - Hep C, Restrepo


Musculoskeletal Medical History: Reports Hx Arthritis - KALA KNEES/SHOULDERS  

BACK


Past Surgical History: Reports: Hx Appendectomy, Hx Breast Surgery - left 

lumpectomy and 4 lymph nodes removed, Hx Cholecystectomy, Hx Gynecologic Surgery

- partial hysterectomy, Hx Orthopedic Surgery - left wrist, shoulder, bilateral 

knee





Physical Exam





- General


General appearance: Appears well, Alert


In distress: None


Notes: 





PHYSICAL EXAMINATION: 


GENERAL: Well-appearing and in no acute distress. 


HEAD: Atraumatic, normocephalic. 


EYES: sclera anicteric, conjunctiva are normal. 


ENT: nares patent. Moist mucous membranes. 


NECK: Normal range of motion, supple  


LUNGS: CTAB and equal. No wheezes rales or rhonchi. 


HEART: Regular rate and rhythm without murmurs 


NEUROLOGICAL:  Normal speech. Normal gait.


PSYCH: Normal mood, normal affect. 


SKIN: Warm, Dry, normal turgor








Diagnostic Results


Laboratory Results: 


Patient's rapid strep and influenza test both negative.





Patient Education/Counseling


Counseling/Education: 





Patient presents with upper respiratory symptoms worrisome for possible Covid 

19.  Patient does not have emergency worring symptoms such as difficulty 

breathing, shortness of breath, chest pain, pressure, confusion or cyanosis.  

Patient appears suitable for discharge.  Patient's vital signs are stable and 

patient is nontoxic in appearance.  Good return precautions have been discussed 

with patient, patient verbalized understanding and is agreeable with discharge 

plan of care at this time.





Patient was provided with discharge instructions including:





As a person under investigation for Covid 19, the [place] department of 

Health and Human Services, division of public health advises you to adhere to 

the following guidance until your test results are reported to you.  If your 

test result is positive, you will receive additional information from your 

provider and your local health department at that time.





Remain at home until you are cleared by the health provider or public health 

authorities.





Keep a log of visitors to your home, notify any visitors to your home of your 

isolation status.





If you plan to move to a new address or leave the county, notify the local 

health department in your County.





Call your doctor or seek care if you have an urgent medical need.  Before 

seeking medical care, call ahead to get instructions from the provider before 

arriving at the medical office clinic or hospital.  Notify them that you are 

being tested for the virus that causes Covid 19 so that arrangements can be 

made, as necessary, to prevent transmission to others in the healthcare setting.

 Next, notify the local health department in your county.





If a medical emergency arises and you need to call 911, inform the first 

responders that you are being tested for the virus that causes Covid 19.  Next, 

notify the local health department in your county.





RDC Discharge





- Discharge


Clinical Impression: 


 covid 19 screening





Upper respiratory infection


Qualifiers:


 URI type: unspecified URI Qualified Code(s): J06.9 - Acute upper respiratory 

infection, unspecified





Condition: Stable


Disposition: Home; Selfcare

## 2020-09-28 ENCOUNTER — HOSPITAL ENCOUNTER (OUTPATIENT)
Dept: HOSPITAL 62 - RAD | Age: 45
End: 2020-09-28
Attending: PHYSICIAN ASSISTANT
Payer: MEDICAID

## 2020-09-28 DIAGNOSIS — M54.5: Primary | ICD-10-CM

## 2020-09-28 PROCEDURE — 72148 MRI LUMBAR SPINE W/O DYE: CPT

## 2020-09-28 NOTE — RADIOLOGY REPORT (SQ)
EXAM DESCRIPTION:  MRI LUMBAR SPINE WITHOUT



IMAGES COMPLETED DATE/TIME:  9/28/2020 3:54 pm



REASON FOR STUDY:  M54.5 LOW BACK PAIN M54.5  LOW BACK PAIN



COMPARISON:  None.



TECHNIQUE:  Sagittal and Axial imaging includes T1, T2, STIR and gradient echo sequences. Coronal T2/
HASTE imaging.



LIMITATIONS:  None.



FINDINGS:  VISUALIZED UPPER ABDOMEN:  Limited evaluation. No acute or suspicious findings suggested.

SEGMENTATION: No transitional anatomy. The lowest well-developed disc space is labeled L5-S1.

ALIGNMENT: Anatomic.

VERTEBRAE: Intact.

BONE MARROW: Normal. No marrow replacement or reactive changes.

DISC SIGNAL: Normal. No significant abnormal signal or loss of height.

POSTERIOR ELEMENTS:  Generally intact.  No pars defect evident.

HARDWARE: None in the spine.

CORD AND CONUS: Normal in size and signal intensity. Conus at the L1 level.

SOFT TISSUES: No aortic aneurysm seen. No bulky retroperitoneal adenopathy or mass. No paraspinal mas
s or fluid.

L1-L2: No significant spinal stenosis or exit foraminal stenosis.

L2-L3: No significant spinal stenosis or exit foraminal stenosis.

L3-L4: No significant spinal stenosis or exit foraminal stenosis.

L4-L5: No significant spinal stenosis or exit foraminal stenosis.

L5-S1: No significant spinal stenosis or exit foraminal stenosis.

LOWER THORACIC: Incompletely imaged.  No stenosis seen.

SACRUM: Visualized upper sacrum intact.

OTHER: No other significant findings.



IMPRESSION:  NORMAL MRI LUMBAR SPINE.



TECHNICAL DOCUMENTATION:  JOB ID:  [id number]

 2011 [employer]- All Rights Reserved



Reading location - IP/workstation name: [email or web address]